# Patient Record
Sex: FEMALE | Race: OTHER | URBAN - METROPOLITAN AREA
[De-identification: names, ages, dates, MRNs, and addresses within clinical notes are randomized per-mention and may not be internally consistent; named-entity substitution may affect disease eponyms.]

---

## 2023-09-01 ENCOUNTER — HOSPITAL ENCOUNTER (EMERGENCY)
Age: 48
Discharge: HOME | End: 2023-09-01
Payer: OTHER MISCELLANEOUS

## 2023-09-01 ENCOUNTER — APPOINTMENT (OUTPATIENT)
Dept: CT IMAGING | Age: 48
End: 2023-09-01
Payer: OTHER MISCELLANEOUS

## 2023-09-01 VITALS
BODY MASS INDEX: 22.48 KG/M2 | DIASTOLIC BLOOD PRESSURE: 63 MMHG | HEIGHT: 65 IN | HEART RATE: 71 BPM | WEIGHT: 134.92 LBS | OXYGEN SATURATION: 98 % | RESPIRATION RATE: 14 BRPM | TEMPERATURE: 98.1 F | SYSTOLIC BLOOD PRESSURE: 127 MMHG

## 2023-09-01 DIAGNOSIS — R20.2 BILATERAL LEG PARESTHESIA: ICD-10-CM
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
## 2023-09-01 PROCEDURE — 72128 CT CHEST SPINE W/O DYE: CPT

## 2023-09-01 PROCEDURE — 72131 CT LUMBAR SPINE W/O DYE: CPT

## 2023-09-01 PROCEDURE — 70450 CT HEAD/BRAIN W/O DYE: CPT

## 2023-09-01 PROCEDURE — 72125 CT NECK SPINE W/O DYE: CPT

## 2023-09-01 PROCEDURE — 99284 EMERGENCY DEPT VISIT MOD MDM: CPT

## 2023-09-01 PROCEDURE — 6370000000 HC RX 637 (ALT 250 FOR IP): Performed by: NURSE PRACTITIONER

## 2023-09-01 RX ORDER — METHOCARBAMOL 500 MG/1
500 TABLET, FILM COATED ORAL 4 TIMES DAILY
Qty: 40 TABLET | Refills: 0 | Status: SHIPPED | OUTPATIENT
Start: 2023-09-01 | End: 2023-09-11

## 2023-09-01 RX ORDER — METHOCARBAMOL 500 MG/1
1500 TABLET, FILM COATED ORAL ONCE
Status: COMPLETED | OUTPATIENT
Start: 2023-09-01 | End: 2023-09-01

## 2023-09-01 RX ADMIN — METHOCARBAMOL 1500 MG: 500 TABLET ORAL at 11:53

## 2023-09-01 ASSESSMENT — PAIN DESCRIPTION - DESCRIPTORS
DESCRIPTORS: TINGLING
DESCRIPTORS: NUMBNESS

## 2023-09-01 ASSESSMENT — PAIN - FUNCTIONAL ASSESSMENT
PAIN_FUNCTIONAL_ASSESSMENT: 0-10
PAIN_FUNCTIONAL_ASSESSMENT: ACTIVITIES ARE NOT PREVENTED
PAIN_FUNCTIONAL_ASSESSMENT: 0-10

## 2023-09-01 ASSESSMENT — PAIN SCALES - GENERAL
PAINLEVEL_OUTOF10: 4
PAINLEVEL_OUTOF10: 2
PAINLEVEL_OUTOF10: 5

## 2023-09-01 ASSESSMENT — PAIN DESCRIPTION - ORIENTATION
ORIENTATION: RIGHT
ORIENTATION: RIGHT;LEFT

## 2023-09-01 ASSESSMENT — PAIN DESCRIPTION - LOCATION
LOCATION: LEG
LOCATION: FOOT

## 2023-09-01 ASSESSMENT — LIFESTYLE VARIABLES
HOW OFTEN DO YOU HAVE A DRINK CONTAINING ALCOHOL: NEVER
HOW MANY STANDARD DRINKS CONTAINING ALCOHOL DO YOU HAVE ON A TYPICAL DAY: PATIENT DOES NOT DRINK

## 2023-09-01 ASSESSMENT — PAIN DESCRIPTION - ONSET: ONSET: ON-GOING

## 2023-09-01 ASSESSMENT — PAIN DESCRIPTION - PAIN TYPE: TYPE: ACUTE PAIN

## 2023-09-01 ASSESSMENT — PAIN DESCRIPTION - FREQUENCY: FREQUENCY: CONTINUOUS

## 2023-09-01 NOTE — ED NOTES
Discharge and education instructions reviewed. Patient verbalized understanding, teach-back successful. Patient denied questions at this time. No acute distress noted. Patient instructed to follow-up as noted - return to emergency department if symptoms worsen. Discharged per EDMD with discharge instructions.        Mery Jules RN  09/01/23 9900

## 2023-09-09 NOTE — ED PROVIDER NOTES
posttibial pulses 2+ equal bilaterally. Brisk capillary refill to fingers and toes. Bilateral upper and lower extremities are warm and natural color. GI:  Soft abdomen that is nontender and nondistended. No guarding or rebound noted. Normal bowel sounds throughout auscultation. No seatbelt sign. Negative [place]'s Sign. Negative Grey-Josue Signs  Musculoskeletal:  No edema, no acute deformities. 5 out of 5 bilateral upper extremity and bilateral lower extremity strength. Bilateral upper and lower extremities are warm and natural color. Patient performs full range of motion without difficulty and without complaints of pain to extremities. BACK: There is no midline T-spine or L-spine pain with palpation. No bony abnormalities or step-offs noted. No parathoracic and paralumbar spine pain with palpation. No soft tissue swelling, abrasions or contusions noted. Negative straight leg raises bilaterally. Integument:  Well hydrated, no petechiae   Neurologic:  Alert & oriented x3 no slurred speech, hand  5/5 in motor testing bilaterally. Performs finger-to-nose touch exam bilaterally without difficulty. Patella and Achilles tendon reflexes are 2+ equal bilaterally she is ambulating in the department with a steady gait. Psych: Pleasant affect, no hallucinations      RADIOLOGY/PROCEDURES    CT HEAD WO CONTRAST   Final Result   No acute intracranial abnormality. Large/prominent cerumen in the right external auditory canal.         CT CERVICAL SPINE WO CONTRAST   Final Result   No acute abnormality of the cervical spine. CT THORACIC SPINE WO CONTRAST   Final Result   Unremarkable CT of the thoracic spine. CT LUMBAR SPINE WO CONTRAST   Final Result   Unremarkable non-contrast CT of the lumbar spine. Labs Reviewed - No data to display    ED COURSE & MEDICAL DECISION MAKING    Pertinent Labs & Imaging studies reviewed and interpreted.  (See chart for details)  Medications